# Patient Record
Sex: MALE | Employment: FULL TIME | URBAN - METROPOLITAN AREA
[De-identification: names, ages, dates, MRNs, and addresses within clinical notes are randomized per-mention and may not be internally consistent; named-entity substitution may affect disease eponyms.]

---

## 2018-07-24 ENCOUNTER — OFFICE VISIT (OUTPATIENT)
Dept: URGENT CARE | Facility: CLINIC | Age: 23
End: 2018-07-24
Payer: COMMERCIAL

## 2018-07-24 VITALS
RESPIRATION RATE: 12 BRPM | DIASTOLIC BLOOD PRESSURE: 72 MMHG | HEART RATE: 67 BPM | TEMPERATURE: 98 F | BODY MASS INDEX: 24.34 KG/M2 | OXYGEN SATURATION: 99 % | WEIGHT: 170 LBS | SYSTOLIC BLOOD PRESSURE: 115 MMHG | HEIGHT: 70 IN

## 2018-07-24 DIAGNOSIS — L03.114 CELLULITIS OF HAND, LEFT: ICD-10-CM
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
## 2018-07-24 DIAGNOSIS — Z76.89 ESTABLISHING CARE WITH NEW DOCTOR, ENCOUNTER FOR: ICD-10-CM

## 2018-07-24 DIAGNOSIS — S60.561A INSECT BITE OF RIGHT HAND WITH LOCAL REACTION, INITIAL ENCOUNTER: Primary | ICD-10-CM

## 2018-07-24 PROCEDURE — 99204 OFFICE O/P NEW MOD 45 MIN: CPT | Mod: 25,S$GLB,, | Performed by: NURSE PRACTITIONER

## 2018-07-24 PROCEDURE — 96372 THER/PROPH/DIAG INJ SC/IM: CPT | Mod: S$GLB,,, | Performed by: NURSE PRACTITIONER

## 2018-07-24 PROCEDURE — 3008F BODY MASS INDEX DOCD: CPT | Mod: CPTII,S$GLB,, | Performed by: NURSE PRACTITIONER

## 2018-07-24 RX ORDER — FINASTERIDE 5 MG/1
5 TABLET, FILM COATED ORAL DAILY
COMMUNITY
End: 2018-08-02

## 2018-07-24 RX ORDER — CEPHALEXIN 500 MG/1
500 TABLET ORAL 3 TIMES DAILY
Qty: 21 TABLET | Refills: 0 | Status: SHIPPED | OUTPATIENT
Start: 2018-07-24 | End: 2018-07-31

## 2018-07-24 RX ORDER — BETAMETHASONE SODIUM PHOSPHATE AND BETAMETHASONE ACETATE 3; 3 MG/ML; MG/ML
6 INJECTION, SUSPENSION INTRA-ARTICULAR; INTRALESIONAL; INTRAMUSCULAR; SOFT TISSUE
Status: COMPLETED | OUTPATIENT
Start: 2018-07-24 | End: 2018-07-24

## 2018-07-24 RX ORDER — DIPHENHYDRAMINE HCL 12.5MG/5ML
25 ELIXIR ORAL
Status: DISCONTINUED | OUTPATIENT
Start: 2018-07-24 | End: 2018-07-24

## 2018-07-24 RX ADMIN — BETAMETHASONE SODIUM PHOSPHATE AND BETAMETHASONE ACETATE 6 MG: 3; 3 INJECTION, SUSPENSION INTRA-ARTICULAR; INTRALESIONAL; INTRAMUSCULAR; SOFT TISSUE at 01:07

## 2018-07-24 NOTE — PROGRESS NOTES
"Subjective:       Patient ID: Robert Pnedleton is a 23 y.o. male.    Vitals:  height is 5' 9.5" (1.765 m) and weight is 77.1 kg (170 lb). His tympanic temperature is 97.7 °F (36.5 °C). His blood pressure is 115/72 and his pulse is 67. His respiration is 12 and oxygen saturation is 99%.     Chief Complaint: Insect Bite    Patient was stung by a wasp on his right hand.        Insect Bite   This is a new problem. The current episode started yesterday. The problem occurs intermittently. The problem has been gradually worsening. Associated symptoms include joint swelling and numbness. Pertinent negatives include no abdominal pain, anorexia, arthralgias, change in bowel habit, chest pain, chills, congestion, coughing, diaphoresis, fatigue, fever, headaches, myalgias, nausea, neck pain, rash, sore throat, swollen glands, urinary symptoms, vertigo, visual change, vomiting or weakness. The symptoms are aggravated by bending. Treatments tried: OTC Anti-histamine equate brand. The treatment provided no relief.     Review of Systems   Constitution: Negative for chills, diaphoresis, fatigue, fever and weakness.   HENT: Negative for congestion and sore throat.    Cardiovascular: Negative for chest pain.   Respiratory: Negative for cough and shortness of breath.    Skin: Positive for itching. Negative for rash.   Musculoskeletal: Positive for joint pain and joint swelling. Negative for arthralgias, myalgias and neck pain.   Gastrointestinal: Negative for abdominal pain, anorexia, change in bowel habit, nausea and vomiting.   Neurological: Positive for numbness. Negative for headaches and vertigo.   All other systems reviewed and are negative.      Objective:      Physical Exam   Constitutional: He is oriented to person, place, and time. Vital signs are normal. He appears well-developed and well-nourished.  Non-toxic appearance. He does not have a sickly appearance. He does not appear ill. No distress.   HENT:   Head: Normocephalic " and atraumatic. Head is without abrasion, without contusion and without laceration.   Right Ear: Hearing, tympanic membrane, external ear and ear canal normal.   Left Ear: Hearing, tympanic membrane, external ear and ear canal normal.   Nose: Nose normal. No mucosal edema, rhinorrhea or sinus tenderness.   Mouth/Throat: Uvula is midline, oropharynx is clear and moist and mucous membranes are normal. Tonsils are 1+ on the right. Tonsils are 1+ on the left. No tonsillar exudate.   Eyes: Conjunctivae, EOM and lids are normal. Pupils are equal, round, and reactive to light.   Neck: Trachea normal, normal range of motion, full passive range of motion without pain and phonation normal. Neck supple.   Cardiovascular: Normal rate, regular rhythm, S1 normal, S2 normal, normal heart sounds and normal pulses.  Exam reveals no decreased pulses.    Pulmonary/Chest: Effort normal and breath sounds normal. No stridor. No respiratory distress. He has no decreased breath sounds. He has no wheezes. He has no rhonchi. He has no rales.   Musculoskeletal: Normal range of motion.   Lymphadenopathy:     He has no cervical adenopathy.   Neurological: He is alert and oriented to person, place, and time.   Skin: Skin is warm, dry and intact. Capillary refill takes less than 2 seconds. No abrasion, no bruising, no burn, no ecchymosis, no laceration, no lesion and no rash noted. No erythema.        Right hand and forearm with swelling, warmth and slight redness.  There is a single bite to the right hand above the 4th finger close to the knuckle.  Affected area is slightly tender secondary to swelling (especially noted when clinching hand into a fist).  Pulses are 2+ and ROM is full.  See attached picture.     Psychiatric: He has a normal mood and affect. His speech is normal and behavior is normal. Judgment and thought content normal. Cognition and memory are normal.   Nursing note and vitals reviewed.            Assessment:       1. Insect  bite of right hand with local reaction, initial encounter    2. Cellulitis of hand, left    3. Establishing care with new doctor, encounter for        Plan:       Benadryl 25mg tab po X 1 in clinic.      Patient informed to return to the clinic on Thursday for re-evaluation of his left hand.      Insect bite of right hand with local reaction, initial encounter  -     diphenhydrAMINE 12.5 mg/5 mL elixir 25 mg; Take 10 mLs (25 mg total) by mouth one time.    Cellulitis of hand, left  -     betamethasone acetate-betamethasone sodium phosphate injection 6 mg; Inject 1 mL (6 mg total) into the muscle one time.  -     Ambulatory referral to Dermatology  -     cephalexin (KEFTAB) 500 mg tablet; Take 1 tablet (500 mg total) by mouth 3 (three) times daily. for 7 days  Dispense: 21 tablet; Refill: 0    Establishing care with new doctor, encounter for  -     Ambulatory referral to Internal Medicine      Patient Instructions     Insect Bite with Local Reaction  Please return here or go to the Emergency Department for any concerns or worsening of condition.   Over the Counter Claritin or Zyrtec as directed daily for the next 5-7  Days.  Please take over the counter Benadryl at night  as directed for the next 24-72 hours as needed for itching.      Please take over the counter Pepcid or Zantac as directed for the next 24-72hours as needed.   If you have a localized reaction it is ok to apply topical Benadryl and/or Cortaid as directed to the affected area.   Cool Compresses to the affected area.   Take meds as prescribed for your infection.    Keep area cleaned and dry; cover in public places  Apply topical ointment aas directed to the affected area.   Follow up with your PCP in the next 2-3 days if no improvement   If you develop an increase in redness, swelling, tenderness, drainage, fever, nausea/vomiting, ect., go to the ER.  If you were given a steroid shot in the clinic and have also been given a prescription for a steroid  such as Prednisone or a Medrol Dose Pack, please begin taking them tomorrow.       General Referral to [facility]  You were referred to [facility] to Establish Care.  Please call [phone] to schedule your appointment.    Please return here or go to the Emergency Department for any concerns or worsening of condition.  Please follow up with your primary care doctor or specialist in the next 48-72hrs as needed.    If you  smoke, please stop smoking.      Insect Bite  Insects most often bite to protect themselves or their nests. Certain bugs, like fleas and mosquitoes, bite to feed. In some cases, the actual bite causes no pain. An itchy red welt or swelling may develop at the site of the bite. Most insect bites do not cause illness. And the itching and swelling most often go away without treatment. However, an infection can develop if the bite is scratched and the skin broken. Rarely, a person may have an allergic reaction to an insect bite.  If a stinger is visible at the bite spot, remove it as quickly as possible, as this can decrease the amount of venom that gets into your body. Scrape it out with a dull edge, such as the edge of a credit card. Try not to squeeze it. Do not try to dig it out, as you may damage the skin and also increase the chance of infection.     To help reduce swelling and itching, apply a cold pack or ice in a zip-top plastic bag wrapped in a thin towel.   Home care  · Your healthcare provider may prescribe over-the-counter medicines to help relieve itching and swelling. Use each medicine according to the directions on the package. If the bite becomes infected, you will need an antibiotic. This may be in pill form taken by mouth or as an ointment or cream put directly on the skin. Be sure to use them exactly as prescribed.  · Bite symptoms usually go away on their own within a week or two.  · To help prevent infection, avoid scratching or picking at the bite.  · To  help relieve itching and swelling, apply ice in a zip-top plastic bag wrapped in a thin towel to the bites. Do this for up to 10 minutes at a time. Avoid hot showers or baths as these tend to make itching worse.  · An over-the-counter anti-itch medicine such as calamine lotion or an antihistamine cream may be helpful.  · If you suspect you have insects in your home, talk to a licensed pest-control professional. He or she can inspect your home and tell you how to get rid of bugs safely.  Follow-up care  Follow up with your healthcare provider, or as advised.  Call 911  Call 911 if any of these occur:  · Trouble breathing or swallowing  · Wheezing  · Feeling like your throat is closing up  · Fainting, loss of consciousness  · Swelling around the face or mouth  When to seek medical advice  Call your healthcare provider right away if any of these occur:  · Fever of 100.4°F (38°C) or higher, or as directed by your healthcare provider  · Signs of infection, such as increased swelling and pain, warmth, red streaks, or drainage from the skin  · Signs of allergic reaction, such as hives, a spreading rash, or throat itching  Date Last Reviewed: 10/1/2016  © 7662-3115 [employer]. [address] [place] [address], [place] [address]. All rights reserved. This information is not intended as a substitute for professional medical care. Always follow your healthcare professional's instructions.        Discharge Instructions for Cellulitis  You have been diagnosed with cellulitis. This is an infection in the deepest layer of the skin. In some cases, the infection also affects the muscle. Cellulitis is caused by bacteria. The bacteria can enter the body through broken skin. This can happen with a cut, scratch, animal bite, or an insect bite that has been scratched. You may have been treated in the hospital with antibiotics and fluids. You will likely be given a prescription for antibiotics to take at home. This sheet will help  you take care of yourself at home.  Home care  When you are home:  · Take the prescribed antibiotic medicine you are given as directed until it is gone. Take it even if you feel better. It treats the infection and stops it from returning. Not taking all the medicine can make future infections hard to treat.  · Keep the infected area clean.  · When possible, raise the infected area above the level of your heart. This helps keep swelling down.  · Talk with your healthcare provider if you are in pain. Ask what kind of over-the-counter medicine you can take for pain.  · Apply clean bandages as advised.  · Take your temperature once a day for a week.  · Wash your hands often to prevent spreading the infection.  In the future, wash your hands before and after you touch cuts, scratches, or bandages. This will help prevent infection.   When to call your healthcare provider  Call your healthcare provider immediately if you have any of the following:  · Difficulty or pain when moving the joints above or below the infected area  · Discharge or pus draining from the area  · Fever of 100.4°F (38°C) or higher, or as directed by your healthcare provider  · Pain that gets worse in or around the infected   · Redness that gets worse in or around the infected area, particularly if the area of redness expands to a wider area  · Shaking chills  · Swelling of the infected area  · Vomiting   Date Last Reviewed: 8/1/2016  © 5704-3040 [employer]. [address] [place] [id number]. All rights reserved. This information is not intended as a substitute for professional medical care. Always follow your healthcare professional's instructions.

## 2018-07-24 NOTE — PATIENT INSTRUCTIONS
Insect Bite with Local Reaction  Please return here or go to the Emergency Department for any concerns or worsening of condition.   Over the Counter Claritin or Zyrtec as directed daily for the next 5-7  Days.  Please take over the counter Benadryl at night  as directed for the next 24-72 hours as needed for itching.      Please take over the counter Pepcid or Zantac as directed for the next 24-72hours as needed.   If you have a localized reaction it is ok to apply topical Benadryl and/or Cortaid as directed to the affected area.   Cool Compresses to the affected area.   Take meds as prescribed for your infection.    Keep area cleaned and dry; cover in public places  Apply topical ointment aas directed to the affected area.   Follow up with your PCP in the next 2-3 days if no improvement   If you develop an increase in redness, swelling, tenderness, drainage, fever, nausea/vomiting, ect., go to the ER.  If you were given a steroid shot in the clinic and have also been given a prescription for a steroid such as Prednisone or a Medrol Dose Pack, please begin taking them tomorrow.       General Referral to [facility]  You were referred to [facility] to Establish Care.  Please call [phone] to schedule your appointment.    Please return here or go to the Emergency Department for any concerns or worsening of condition.  Please follow up with your primary care doctor or specialist in the next 48-72hrs as needed.    If you  smoke, please stop smoking.      Insect Bite  Insects most often bite to protect themselves or their nests. Certain bugs, like fleas and mosquitoes, bite to feed. In some cases, the actual bite causes no pain. An itchy red welt or swelling may develop at the site of the bite. Most insect bites do not cause illness. And the itching and swelling most often go away without treatment. However, an infection can develop if the bite is scratched and the skin broken. Rarely, a person  may have an allergic reaction to an insect bite.  If a stinger is visible at the bite spot, remove it as quickly as possible, as this can decrease the amount of venom that gets into your body. Scrape it out with a dull edge, such as the edge of a credit card. Try not to squeeze it. Do not try to dig it out, as you may damage the skin and also increase the chance of infection.     To help reduce swelling and itching, apply a cold pack or ice in a zip-top plastic bag wrapped in a thin towel.   Home care  · Your healthcare provider may prescribe over-the-counter medicines to help relieve itching and swelling. Use each medicine according to the directions on the package. If the bite becomes infected, you will need an antibiotic. This may be in pill form taken by mouth or as an ointment or cream put directly on the skin. Be sure to use them exactly as prescribed.  · Bite symptoms usually go away on their own within a week or two.  · To help prevent infection, avoid scratching or picking at the bite.  · To help relieve itching and swelling, apply ice in a zip-top plastic bag wrapped in a thin towel to the bites. Do this for up to 10 minutes at a time. Avoid hot showers or baths as these tend to make itching worse.  · An over-the-counter anti-itch medicine such as calamine lotion or an antihistamine cream may be helpful.  · If you suspect you have insects in your home, talk to a licensed pest-control professional. He or she can inspect your home and tell you how to get rid of bugs safely.  Follow-up care  Follow up with your healthcare provider, or as advised.  Call 911  Call 911 if any of these occur:  · Trouble breathing or swallowing  · Wheezing  · Feeling like your throat is closing up  · Fainting, loss of consciousness  · Swelling around the face or mouth  When to seek medical advice  Call your healthcare provider right away if any of these occur:  · Fever of 100.4°F (38°C) or higher, or as directed by your healthcare  provider  · Signs of infection, such as increased swelling and pain, warmth, red streaks, or drainage from the skin  · Signs of allergic reaction, such as hives, a spreading rash, or throat itching  Date Last Reviewed: 10/1/2016  © 8508-9768 [employer]. [address] [place] [id number]. All rights reserved. This information is not intended as a substitute for professional medical care. Always follow your healthcare professional's instructions.        Discharge Instructions for Cellulitis  You have been diagnosed with cellulitis. This is an infection in the deepest layer of the skin. In some cases, the infection also affects the muscle. Cellulitis is caused by bacteria. The bacteria can enter the body through broken skin. This can happen with a cut, scratch, animal bite, or an insect bite that has been scratched. You may have been treated in the hospital with antibiotics and fluids. You will likely be given a prescription for antibiotics to take at home. This sheet will help you take care of yourself at home.  Home care  When you are home:  · Take the prescribed antibiotic medicine you are given as directed until it is gone. Take it even if you feel better. It treats the infection and stops it from returning. Not taking all the medicine can make future infections hard to treat.  · Keep the infected area clean.  · When possible, raise the infected area above the level of your heart. This helps keep swelling down.  · Talk with your healthcare provider if you are in pain. Ask what kind of over-the-counter medicine you can take for pain.  · Apply clean bandages as advised.  · Take your temperature once a day for a week.  · Wash your hands often to prevent spreading the infection.  In the future, wash your hands before and after you touch cuts, scratches, or bandages. This will help prevent infection.   When to call your healthcare provider  Call your healthcare provider immediately if you have any  of the following:  · Difficulty or pain when moving the joints above or below the infected area  · Discharge or pus draining from the area  · Fever of 100.4°F (38°C) or higher, or as directed by your healthcare provider  · Pain that gets worse in or around the infected   · Redness that gets worse in or around the infected area, particularly if the area of redness expands to a wider area  · Shaking chills  · Swelling of the infected area  · Vomiting   Date Last Reviewed: 8/1/2016  © 7201-6552 [employer]. [address] [place], [place] [id number]. All rights reserved. This information is not intended as a substitute for professional medical care. Always follow your healthcare professional's instructions.

## 2018-07-27 ENCOUNTER — TELEPHONE (OUTPATIENT)
Dept: URGENT CARE | Facility: CLINIC | Age: 23
End: 2018-07-27

## 2018-07-31 NOTE — PROGRESS NOTES
Subjective:       Patient ID: Robert Pendleton is a 23 y.o. male with no significant PMH who presents today to establish care.  Patient was seen in Urgent Care on 8/2/2018 for Insect bite of righ hand and treated with Benadryl, Steroid injection, Cephalexin for 7 days.  He was Referred to Dermatology.  Resolved now.    Chief Complaint: Establish Care (both shoulders possible PT) and Low-back Pain    HPI   Patient with right lower back pain - feels like a strain after lifting at the gym a year and s/p PT for 3 months.  Improved, but exacerbated with going back to the gym.  No radiculopathy.  Having pain in shoulders when lifting weights, but no pain with no lifting.  S/p PT for this as well.  Having folliculitis in groin and recurrent.  Flares up when trimming or tight clothing.  Using baby powder and helping a little.  Patient denies f/c, n/v/d.  No chest pain or SOB.  No abdominal pain or dysuria.  No headaches or change in vision.  No dizziness.  No significant  weight gain or weight loss.  Remaining ROS negative.    Review of Systems   Constitutional: Negative for appetite change, diaphoresis, fatigue and unexpected weight change.   HENT: Negative for congestion, ear pain, hearing loss, rhinorrhea, sinus pressure, sore throat, trouble swallowing and voice change.    Eyes: Negative for photophobia, pain and visual disturbance.   Respiratory: Negative for cough, chest tightness, shortness of breath and wheezing.    Cardiovascular: Negative for chest pain, palpitations and leg swelling.   Gastrointestinal: Negative for abdominal pain, blood in stool, constipation, diarrhea, nausea and vomiting.   Endocrine: Negative for cold intolerance, heat intolerance, polydipsia, polyphagia and polyuria.   Genitourinary: Negative for decreased urine volume, difficulty urinating, discharge, dysuria, flank pain, hematuria, scrotal swelling, testicular pain and urgency.        Groin folliculitis   Musculoskeletal: Positive for  arthralgias (bilateral shoulder) and back pain. Negative for myalgias and neck pain.   Skin: Negative for rash.   Neurological: Negative for dizziness, tremors, syncope, weakness, numbness and headaches.   Hematological: Does not bruise/bleed easily.   Psychiatric/Behavioral: Negative for agitation, confusion and sleep disturbance. The patient is not nervous/anxious.        Objective:      Physical Exam   Constitutional: He is oriented to person, place, and time. He appears well-developed and well-nourished.   HENT:   Head: Normocephalic.   Nose: Nose normal.   Mouth/Throat: Oropharynx is clear and moist.   Eyes: Conjunctivae and EOM are normal. Pupils are equal, round, and reactive to light. Right eye exhibits no discharge. Left eye exhibits no discharge. No scleral icterus.   Neck: Normal range of motion. Neck supple. No thyromegaly present.   Cardiovascular: Normal rate, regular rhythm, normal heart sounds and intact distal pulses.  Exam reveals no gallop and no friction rub.    No murmur heard.  Pulmonary/Chest: Effort normal and breath sounds normal. No respiratory distress. He has no wheezes. He has no rales.   Abdominal: Soft. Bowel sounds are normal. He exhibits no distension. There is no tenderness. There is no rebound and no guarding.   Genitourinary: Rectum normal.   Genitourinary Comments: Pictures with several pustules at the base of the penis and exam with one burst pustule on scrotum.  No erythema.  No tenderness.   Musculoskeletal: Normal range of motion. He exhibits no edema, tenderness or deformity.   Lymphadenopathy:     He has no cervical adenopathy.   Neurological: He is alert and oriented to person, place, and time. No cranial nerve deficit or sensory deficit.   Skin: Skin is warm and dry. No rash noted. No erythema.   Psychiatric: He has a normal mood and affect. His behavior is normal. Thought content normal.       Assessment:       1. Bacterial folliculitis    2. Hair loss    3. Chronic pain  of both shoulders    4. Chronic bilateral low back pain without sciatica    5. Encounter for wellness examination in adult    6. Screen for STD (sexually transmitted disease)        Plan:    -Adult Wellness Exam - Patient overall stable with good BP today.   Will order fasting labs.  Offered STD screening - will order.    -[facility] - Shoulder Pain and LBP - FROM of both shoulders and no crepitance.  LBP with negative straight leg raises.  Will refer to PT for strengthening.    -Dermatology - Patient was seen in Urgent Care on 7/24/2018 for Insect bite of righ hand and treated with Benadryl, Steroid injection, Cephalexin for 7 days.  He was Referred to Dermatology - has appointment on 9/19/2018.  Takes Finasteride once a day for MPB.  Groin folliculitis -   I will prescribe topical Clindamycin to apply twice a day for 7-10 days.  Continue to use antibacterial soap and keep area dry.  Avoid shaving and clipping too close to the skin.     -[facility] - Discussed Flu and Tdap (2012) Vaccines.        -Follow up in 1 year

## 2018-08-02 ENCOUNTER — OFFICE VISIT (OUTPATIENT)
Dept: PRIMARY CARE CLINIC | Facility: CLINIC | Age: 23
End: 2018-08-02
Payer: COMMERCIAL

## 2018-08-02 VITALS
BODY MASS INDEX: 26.8 KG/M2 | HEART RATE: 83 BPM | WEIGHT: 187.19 LBS | DIASTOLIC BLOOD PRESSURE: 77 MMHG | OXYGEN SATURATION: 98 % | TEMPERATURE: 98 F | HEIGHT: 70 IN | SYSTOLIC BLOOD PRESSURE: 134 MMHG

## 2018-08-02 DIAGNOSIS — L73.8 BACTERIAL FOLLICULITIS: Primary | ICD-10-CM

## 2018-08-02 DIAGNOSIS — L65.9 HAIR LOSS: ICD-10-CM

## 2018-08-02 DIAGNOSIS — M25.511 CHRONIC PAIN OF BOTH SHOULDERS: ICD-10-CM

## 2018-08-02 DIAGNOSIS — Z11.3 SCREEN FOR STD (SEXUALLY TRANSMITTED DISEASE): ICD-10-CM

## 2018-08-02 DIAGNOSIS — M54.50 CHRONIC BILATERAL LOW BACK PAIN WITHOUT SCIATICA: ICD-10-CM

## 2018-08-02 DIAGNOSIS — G89.29 CHRONIC BILATERAL LOW BACK PAIN WITHOUT SCIATICA: ICD-10-CM

## 2018-08-02 DIAGNOSIS — Z00.00 ENCOUNTER FOR WELLNESS EXAMINATION IN ADULT: ICD-10-CM

## 2018-08-02 DIAGNOSIS — G89.29 CHRONIC PAIN OF BOTH SHOULDERS: ICD-10-CM

## 2018-08-02 DIAGNOSIS — M25.512 CHRONIC PAIN OF BOTH SHOULDERS: ICD-10-CM

## 2018-08-02 PROCEDURE — 99999 PR PBB SHADOW E&M-EST. PATIENT-LVL IV: CPT | Mod: PBBFAC,,, | Performed by: INTERNAL MEDICINE

## 2018-08-02 PROCEDURE — 3008F BODY MASS INDEX DOCD: CPT | Mod: CPTII,S$GLB,, | Performed by: INTERNAL MEDICINE

## 2018-08-02 PROCEDURE — 99203 OFFICE O/P NEW LOW 30 MIN: CPT | Mod: S$GLB,,, | Performed by: INTERNAL MEDICINE

## 2018-08-02 RX ORDER — FINASTERIDE 1 MG/1
1 TABLET, FILM COATED ORAL DAILY
Qty: 30 TABLET | Refills: 3
Start: 2018-08-02 | End: 2019-02-08

## 2018-08-02 RX ORDER — CLINDAMYCIN PHOSPHATE 11.9 MG/ML
SOLUTION TOPICAL 2 TIMES DAILY
Qty: 30 ML | Refills: 1 | Status: SHIPPED | OUTPATIENT
Start: 2018-08-02 | End: 2018-08-12

## 2018-08-02 NOTE — PATIENT INSTRUCTIONS
Your exam was overall normal today.  Your blood pressure was good.  I will refer you to Physical Therapy for your Shoulder and Back Pain.  I will order fasting labs to be scheduled at your convenience.  For your Folliculitis, I will prescribe topical Clindamycin to apply twice a day for 7-10 days.  Continue to use antibacterial soap and keep area dry.  Avoid shaving and clipping too close to the skin.  We will check and STD screen for HIV 1/2, Syphilis, Gonorrhea and Chlamydia.  Return in 1 year - sooner if needed.  Please come at least 15-20 minutes before your scheduled appointment time.      Understanding Folliculitis  Folliculitis is when hair follicles become inflamed. Follicles are the tiny holes from which hair grows out of your skin. This skin condition can occur any place on the body where hair grows. But its often found on the neck, face, and scalp.  How to say it  [id number]   What causes folliculitis?  An infection or irritation can cause this skin condition. It may be from bacteria or a fungus. The condition can also happen from a wound or irritation of the skin. Shaving is a common cause. Some cases may come from taking certain medicines, such as those that treat acne.  Symptoms of folliculitis  This skin condition tends to develop quickly. It looks like little pimples on a base of a red, inflamed hair follicles. These bumps may ooze pus. They may also be:  · Itchy  · Painful  · Red  · Swollen  Treatment for folliculitis  Treatment depends on the cause of the inflammation. In some cases, this skin condition will go away on its own in a few days. But it may return. Treatment options include:  · Warm compress. Putting a warm, wet washcloth on the inflamed skin may help.  · Medicine. Many skin (topical) and oral medicines are available. Antibiotics are used for bacterial infections. Antifungal medicines are best for fungal infections.  · Good hygiene. Keeping the skin clean can help. Use a clean  razor when shaving. Prevent ingrown hairs after shaving. This can reduce folliculitis in the beard area. Avoid any substances that bother your skin.  When to call your healthcare provider  Call your healthcare provider right away if you have any of these:  · Fever of 100.4°F (38°C) or higher, or as directed  · Pain that gets worse  · Symptoms that dont get better, or get worse  · New symptoms   Date Last Reviewed: 5/1/2016 © 2000-2017 [employer]. [address] [place] [address], [place] [address]. All rights reserved. This information is not intended as a substitute for professional medical care. Always follow your healthcare professional's instructions.

## 2018-08-02 NOTE — LETTER
August 2, 2018      Lupis Oakes, NP  [address] [facility] [address]           [place] - Primary Care  [address] [facility] [address]  Phone: [phone]  Fax: [phone]          Patient: Robert Pendleton   MR Number: [id number]   YOB: 1995   Date of Visit: 8/2/2018       Dear Lupis Oakes:    Thank you for referring Robert Pendleton to me for evaluation. Attached you will find relevant portions of my assessment and plan of care.    If you have questions, please do not hesitate to call me. I look forward to following Robert Pendleton along with you.    Sincerely,    Johny Ledezma MD    Enclosure  CC:  No Recipients    If you would like to receive this communication electronically, please contact [email or web address] or [phone] to request more information on [employer] Link access.    For providers and/or their staff who would like to refer a patient to [facility], please contact us through our one-stop-shop provider referral line, [facility], at [phone].    If you feel you have received this communication in error or would no longer like to receive these types of communications, please e-mail [email or web address]

## 2018-09-19 ENCOUNTER — INITIAL CONSULT (OUTPATIENT)
Dept: DERMATOLOGY | Facility: CLINIC | Age: 23
End: 2018-09-19
Payer: COMMERCIAL

## 2018-09-19 DIAGNOSIS — Z22.321 SUSPECTED CARRIER OF METHICILLIN SUSCEPTIBLE STAPHYLOCOCCUS AUREUS (MSSA): ICD-10-CM

## 2018-09-19 DIAGNOSIS — L73.8 BACTERIAL FOLLICULITIS: Primary | ICD-10-CM

## 2018-09-19 PROCEDURE — 99202 OFFICE O/P NEW SF 15 MIN: CPT | Mod: S$GLB,,, | Performed by: DERMATOLOGY

## 2018-09-19 RX ORDER — CLINDAMYCIN PHOSPHATE 11.9 MG/ML
SOLUTION TOPICAL
Qty: 60 ML | Refills: 3 | Status: SHIPPED | OUTPATIENT
Start: 2018-09-19 | End: 2019-06-18

## 2018-09-19 NOTE — PROGRESS NOTES
Subjective:       Patient ID:  Robert Pendleton is a 23 y.o. male who presents for   Chief Complaint   Patient presents with    Folliculitis     groin     Folliculitis  - Initial  Affected locations: groin  Duration: 1 year  Signs / symptoms: tender, redness, swelling and itching  Severity: moderate  Timing: intermittent  Aggravated by: pressure, friction and shaving  Relieving factors/Treatments tried: antibiotics (topical clindamycin, baby powder - both help but recurrent)  Improvement on treatment: significant      Review of Systems   Musculoskeletal: Negative for joint swelling and arthralgias.   Skin: Negative for tendency to form keloidal scars and recent sunburn.   Hematologic/Lymphatic: Does not bruise/bleed easily.        Objective:    Physical Exam   Constitutional: He appears well-developed and well-nourished. No distress.   Neurological: He is alert and oriented to person, place, and time. He is not disoriented.   Psychiatric: He has a normal mood and affect.   Skin:   Areas Examined (abnormalities noted in diagram):   Head / Face Inspection Performed  Neck Inspection Performed  Genitals / Buttocks / Groin Inspection Performed              Diagram Legend     Erythematous scaling macule/papule c/w actinic keratosis       Vascular papule c/w angioma      Pigmented verrucoid papule/plaque c/w seborrheic keratosis      Yellow umbilicated papule c/w sebaceous hyperplasia      Irregularly shaped tan macule c/w lentigo     1-2 mm smooth white papules consistent with Milia      Movable subcutaneous cyst with punctum c/w epidermal inclusion cyst      Subcutaneous movable cyst c/w pilar cyst      Firm pink to brown papule c/w dermatofibroma      Pedunculated fleshy papule(s) c/w skin tag(s)      Evenly pigmented macule c/w junctional nevus     Mildly variegated pigmented, slightly irregular-bordered macule c/w mildly atypical nevus      Flesh colored to evenly pigmented papule c/w intradermal nevus        Pink pearly papule/plaque c/w basal cell carcinoma      Erythematous hyperkeratotic cursted plaque c/w SCC      Surgical scar with no sign of skin cancer recurrence      Open and closed comedones      Inflammatory papules and pustules      Verrucoid papule consistent consistent with wart     Erythematous eczematous patches and plaques     Dystrophic onycholytic nail with subungual debris c/w onychomycosis     Umbilicated papule    Erythematous-base heme-crusted tan verrucoid plaque consistent with inflamed seborrheic keratosis     Erythematous Silvery Scaling Plaque c/w Psoriasis     See annotation      Assessment / Plan:        Bacterial folliculitis + Suspected carrier of methicillin susceptible Staphylococcus aureus (MSSA)  -     clindamycin (CLEOCIN T) 1 % external solution; Apply to affected areas of body BID prn folliculitis.  Dispense: 60 mL; Refill: 3  -  Recommended a benzoyl peroxide (2-5%) wash, such as PanOxyl 4% Creamy Wash or Neutrogena Clear Pore Cleanser/Mask. Reminded patient that benzoyl peroxide can bleach towels, sheets, and clothing if not rinsed well from the skin.  -  Recommended washing body with Hibiclens solution for 5-10 minutes 2x/week until clear. Do not allow contact with eyes or ears.  -  It is recommended that patient discontinue shaving until condition is completely resolved.       Follow-up in about 2 months (around 11/19/2018), or if symptoms worsen or fail to improve.

## 2018-09-19 NOTE — LETTER
September 19, 2018      Lupis Oakes, NP  [address] [facility] [address]           [facility] - Dermatology  [address] [facility] [address]  Phone: [phone]  Fax: [phone]          Patient: Robert Pendleton   MR Number: [id number]   YOB: 1995   Date of Visit: 9/19/2018       Dear Lupis Oakes:    Thank you for referring Robert Pendleton to me for evaluation. Attached you will find relevant portions of my assessment and plan of care.    If you have questions, please do not hesitate to call me. I look forward to following Robert Pendleton along with you.    Sincerely,    Lina Fowler MD    Enclosure  CC:  No Recipients    If you would like to receive this communication electronically, please contact [email or web address] or [phone] to request more information on [employer] Link access.    For providers and/or their staff who would like to refer a patient to [facility], please contact us through our one-stop-shop provider referral line, [facility], at [phone].    If you feel you have received this communication in error or would no longer like to receive these types of communications, please e-mail [email or web address]

## 2019-01-08 ENCOUNTER — TELEPHONE (OUTPATIENT)
Dept: PRIMARY CARE CLINIC | Facility: CLINIC | Age: 24
End: 2019-01-08

## 2019-01-08 NOTE — TELEPHONE ENCOUNTER
Tried to call pt back, but he didn't answer, and a voicemail wasn't set up.    ----- Message from Elizabeth Peters sent at 1/8/2019  3:07 PM CST -----  Contact: RAUL ARTHUR [[id number]]  Name of Who is Calling: RAUL ARTHUR [[id number]]      What is the request in detail: Patient called he need another referral for PT. Please call him.      Can the clinic reply by [facility]: no      What Number to Call Back [facility] not in [facility]: [phone]

## 2019-01-30 ENCOUNTER — TELEPHONE (OUTPATIENT)
Dept: PRIMARY CARE CLINIC | Facility: CLINIC | Age: 24
End: 2019-01-30

## 2019-01-30 DIAGNOSIS — M54.5 CHRONIC MIDLINE LOW BACK PAIN, WITH SCIATICA PRESENCE UNSPECIFIED: Primary | ICD-10-CM

## 2019-01-30 DIAGNOSIS — G89.29 CHRONIC MIDLINE LOW BACK PAIN, WITH SCIATICA PRESENCE UNSPECIFIED: Primary | ICD-10-CM

## 2019-01-30 NOTE — TELEPHONE ENCOUNTER
Spoke w/ pt and he is requesting a new pt referral.    ----- Message from Cecilia Michael sent at 1/30/2019 12:03 PM CST -----  Contact: RAUL ARTHUR [[id number]]  Name of Who is Calling:RAUL ARTHUR [[id number]]        What is the request in detail: Pt  Returned call to staff regarding referral to Physical Therapy. Contact at your earliest convenience.  Thanks-          Can the clinic reply by [facility]: N    What Number to Call Back [facility] not in [facility]: [phone]

## 2019-02-08 ENCOUNTER — OFFICE VISIT (OUTPATIENT)
Dept: DERMATOLOGY | Facility: CLINIC | Age: 24
End: 2019-02-08
Payer: COMMERCIAL

## 2019-02-08 DIAGNOSIS — A60.01 HERPES SIMPLEX INFECTION OF PENIS: ICD-10-CM

## 2019-02-08 DIAGNOSIS — B07.9 VIRAL WARTS, UNSPECIFIED TYPE: Primary | ICD-10-CM

## 2019-02-08 DIAGNOSIS — L64.9 ANDROGENIC ALOPECIA: ICD-10-CM

## 2019-02-08 PROCEDURE — 17110 DESTRUCTION B9 LES UP TO 14: CPT | Mod: S$GLB,,, | Performed by: DERMATOLOGY

## 2019-02-08 PROCEDURE — 99214 PR OFFICE/OUTPT VISIT, EST, LEVL IV, 30-39 MIN: ICD-10-PCS | Mod: 25,S$GLB,, | Performed by: DERMATOLOGY

## 2019-02-08 PROCEDURE — 99214 OFFICE O/P EST MOD 30 MIN: CPT | Mod: 25,S$GLB,, | Performed by: DERMATOLOGY

## 2019-02-08 PROCEDURE — 17110 PR DESTRUCTION BENIGN LESIONS UP TO 14: ICD-10-PCS | Mod: S$GLB,,, | Performed by: DERMATOLOGY

## 2019-02-08 RX ORDER — FINASTERIDE 1 MG/1
1 TABLET, FILM COATED ORAL DAILY
Qty: 90 TABLET | Refills: 3 | Status: SHIPPED | OUTPATIENT
Start: 2019-02-08 | End: 2020-02-03

## 2019-02-08 RX ORDER — VALACYCLOVIR HYDROCHLORIDE 1 G/1
1000 TABLET, FILM COATED ORAL DAILY
Qty: 30 TABLET | Refills: 3 | Status: SHIPPED | OUTPATIENT
Start: 2019-02-08 | End: 2019-06-14 | Stop reason: SDUPTHER

## 2019-02-08 NOTE — PROGRESS NOTES
Subjective:       Patient ID:  Robert Pendleton is a 23 y.o. male who presents for   Chief Complaint   Patient presents with    Folliculitis     groin    Growth     R ear     Folliculitis  - Follow-up  Symptom course: worsening (getting 1 breakout per week)  Currently using: clindamycin.  Affected locations: left upper leg and genitalia  Signs / symptoms: spreading, burning and itching (feels itching and a pain down his left leg prior to breakout of red bumps)  Severity: moderate    Growth  - Initial  Affected locations: right ear  Duration: 5 months  Signs / symptoms: growing  Severity: mild to moderate  Timing: constant  Aggravated by: picking  Relieving factors/Treatments tried: nothing  Improvement on treatment: no relief    Hair/Scalp Problem  - Initial  Affected locations: scalp  Duration: 1 year  Signs / symptoms: asymptomatic  Severity: mild to moderate  Timing: constant  Aggravated by: nothing  Treatments tried: has taken propecia with no problems, needs a refill.  Improvement on treatment: mild      Review of Systems   Musculoskeletal: Negative for joint swelling and arthralgias.   Skin: Positive for itching and rash. Negative for recent sunburn.   Hematologic/Lymphatic: Does not bruise/bleed easily.        Objective:    Physical Exam   Constitutional: He appears well-developed and well-nourished. No distress.   HENT:   Mouth/Throat: Lips normal.    Eyes: Lids are normal.  No conjunctival no injection.   Neurological: He is alert and oriented to person, place, and time. He is not disoriented.   Psychiatric: He has a normal mood and affect.   Skin:   Areas Examined (abnormalities noted in diagram):   Head / Face Inspection Performed  Neck Inspection Performed  Genitals / Buttocks / Groin Inspection Performed  RUE Inspected  LUE Inspection Performed                   Diagram Legend     Erythematous scaling macule/papule c/w actinic keratosis       Vascular papule c/w angioma      Pigmented verrucoid  papule/plaque c/w seborrheic keratosis      Yellow umbilicated papule c/w sebaceous hyperplasia      Irregularly shaped tan macule c/w lentigo     1-2 mm smooth white papules consistent with Milia      Movable subcutaneous cyst with punctum c/w epidermal inclusion cyst      Subcutaneous movable cyst c/w pilar cyst      Firm pink to brown papule c/w dermatofibroma      Pedunculated fleshy papule(s) c/w skin tag(s)      Evenly pigmented macule c/w junctional nevus     Mildly variegated pigmented, slightly irregular-bordered macule c/w mildly atypical nevus      Flesh colored to evenly pigmented papule c/w intradermal nevus       Pink pearly papule/plaque c/w basal cell carcinoma      Erythematous hyperkeratotic cursted plaque c/w SCC      Surgical scar with no sign of skin cancer recurrence      Open and closed comedones      Inflammatory papules and pustules      Verrucoid papule consistent consistent with wart     Erythematous eczematous patches and plaques     Dystrophic onycholytic nail with subungual debris c/w onychomycosis     Umbilicated papule    Erythematous-base heme-crusted tan verrucoid plaque consistent with inflamed seborrheic keratosis     Erythematous Silvery Scaling Plaque c/w Psoriasis     See annotation      Assessment / Plan:        Viral warts, unspecified type  Discussed viral etiology of warts and their treatment options. Discussed the potential need for multiple treatments.    Cryosurgery procedure note:  Risk, benefits, and alternatives of cryosurgery are discussed with the patient, including risk of hypo- or hyperpigmentation, scar, infection, recurrence, and need for additional treatment of site. Verbal consent obtained from patient. Liquid nitrogen cryosurgery applied to 1 lesion(s) to produce a freeze injury. Counseled patient that blisters may form and instructed patient on wound care with gentle cleansing and use of Vaseline ointment to keep moist until healed. Handout was provided, and  patient was instructed to return to clinic in 1-2 months if lesions do not completely resolve.    Herpes simplex infection of penis  Based on patient's recent description of symptoms, but without ever seeing the lesions personally, I suspect HSV. Patient was shown a representative photo of HSV and agrees with the diagnosis. Due to the fact that he is currently having weekly outbreaks, will put him on a suppressive dose of valtrex.  -     valACYclovir (VALTREX) 1000 MG tablet; Take 1 tablet (1,000 mg total) by mouth once daily.  Dispense: 30 tablet; Refill: 3    Androgenic alopecia  -     finasteride (PROPECIA) 1 mg tablet; Take 1 tablet (1 mg total) by mouth once daily.  Dispense: 90 tablet; Refill: 3  - D/C if any side effects such as decreased libido or erectile dysfunction.      Follow-up in about 4 weeks (around 3/8/2019).

## 2019-02-15 ENCOUNTER — OFFICE VISIT (OUTPATIENT)
Dept: URGENT CARE | Facility: CLINIC | Age: 24
End: 2019-02-15
Payer: COMMERCIAL

## 2019-02-15 ENCOUNTER — CLINICAL SUPPORT (OUTPATIENT)
Dept: REHABILITATION | Facility: OTHER | Age: 24
End: 2019-02-15
Payer: COMMERCIAL

## 2019-02-15 VITALS
RESPIRATION RATE: 16 BRPM | WEIGHT: 195 LBS | BODY MASS INDEX: 28.88 KG/M2 | SYSTOLIC BLOOD PRESSURE: 125 MMHG | OXYGEN SATURATION: 98 % | DIASTOLIC BLOOD PRESSURE: 79 MMHG | HEART RATE: 63 BPM | TEMPERATURE: 98 F | HEIGHT: 69 IN

## 2019-02-15 DIAGNOSIS — M79.662 PAIN OF LEFT CALF: ICD-10-CM

## 2019-02-15 DIAGNOSIS — M54.50 CHRONIC RIGHT-SIDED LOW BACK PAIN WITHOUT SCIATICA: ICD-10-CM

## 2019-02-15 DIAGNOSIS — M25.572 ACUTE LEFT ANKLE PAIN: Primary | ICD-10-CM

## 2019-02-15 DIAGNOSIS — M25.472 LEFT ANKLE SWELLING: ICD-10-CM

## 2019-02-15 DIAGNOSIS — G89.29 CHRONIC RIGHT-SIDED LOW BACK PAIN WITHOUT SCIATICA: ICD-10-CM

## 2019-02-15 PROCEDURE — 99214 OFFICE O/P EST MOD 30 MIN: CPT | Mod: S$GLB,,, | Performed by: NURSE PRACTITIONER

## 2019-02-15 PROCEDURE — 97110 THERAPEUTIC EXERCISES: CPT | Mod: PN | Performed by: INTERNAL MEDICINE

## 2019-02-15 PROCEDURE — 3008F PR BODY MASS INDEX (BMI) DOCUMENTED: ICD-10-PCS | Mod: CPTII,S$GLB,, | Performed by: NURSE PRACTITIONER

## 2019-02-15 PROCEDURE — 99214 PR OFFICE/OUTPT VISIT, EST, LEVL IV, 30-39 MIN: ICD-10-PCS | Mod: S$GLB,,, | Performed by: NURSE PRACTITIONER

## 2019-02-15 PROCEDURE — 3008F BODY MASS INDEX DOCD: CPT | Mod: CPTII,S$GLB,, | Performed by: NURSE PRACTITIONER

## 2019-02-15 PROCEDURE — 97161 PT EVAL LOW COMPLEX 20 MIN: CPT | Mod: PN | Performed by: INTERNAL MEDICINE

## 2019-02-15 RX ORDER — NAPROXEN 500 MG/1
500 TABLET ORAL 2 TIMES DAILY WITH MEALS
Qty: 20 TABLET | Refills: 0 | Status: SHIPPED | OUTPATIENT
Start: 2019-02-15 | End: 2019-02-25

## 2019-02-15 NOTE — PROGRESS NOTES
"Subjective:       Patient ID: Robert Pendleton is a 23 y.o. male.    Vitals:  height is 5' 9" (1.753 m) and weight is 88.5 kg (195 lb). His temperature is 98.1 °F (36.7 °C). His blood pressure is 125/79 and his pulse is 63. His respiration is 16 and oxygen saturation is 98%.     Chief Complaint: Ankle Pain (left ankle )    Pt is in for left ankle pain/swelling- started about 4 wks ago. Pt denies injury. Pt took ibuprofen-with no relief.   Working in  office-standing/walking during the day. No hx of gout, trauma, dvt. Reports new shoes approx same time pain started. Reports only wearing shoes 3-4 times. Reports left foot swelling by the end of the day. Reports last night, pain throughout night and now with some left calf pain. Denies sob, lane. Denies recent air travel, hospitalization.       Ankle Pain    The incident occurred more than 1 week ago. The pain is present in the left ankle. The quality of the pain is described as aching and burning. The pain is at a severity of 6/10. The pain has been constant since onset. Associated symptoms include numbness. Pertinent negatives include no inability to bear weight, loss of motion, loss of sensation, muscle weakness or tingling. He reports no foreign bodies present. He has tried NSAIDs for the symptoms. The treatment provided no relief.       Constitution: Negative for chills, sweating, fatigue and fever.   HENT: Negative for ear pain, congestion, sinus pain, sinus pressure, sore throat and voice change.    Neck: Negative for painful lymph nodes.   Cardiovascular: Negative for chest pain and leg swelling.   Eyes: Negative for eye redness, double vision and blurred vision.   Respiratory: Negative for chest tightness, cough, sputum production, bloody sputum, COPD, shortness of breath, stridor, wheezing and asthma.    Gastrointestinal: Negative for nausea, vomiting and diarrhea.   Genitourinary: Negative for dysuria, frequency and urgency. "   Musculoskeletal: Positive for joint pain and joint swelling. Negative for muscle cramps and muscle ache.   Skin: Negative for color change, pale and rash.   Allergic/Immunologic: Negative for seasonal allergies and asthma.   Neurological: Positive for numbness. Negative for dizziness, history of vertigo, light-headedness, passing out and headaches.   Hematologic/Lymphatic: Negative for swollen lymph nodes, easy bruising/bleeding and history of blood clots. Does not bruise/bleed easily.   Psychiatric/Behavioral: Negative for nervous/anxious, sleep disturbance and depression. The patient is not nervous/anxious.        Objective:      Physical Exam   Constitutional: He is oriented to person, place, and time. He appears well-developed and well-nourished. He is cooperative.  Non-toxic appearance. He does not appear ill. No distress.   HENT:   Head: Normocephalic and atraumatic. Head is without abrasion, without contusion and without laceration.   Right Ear: Hearing, tympanic membrane, external ear and ear canal normal. No hemotympanum.   Left Ear: Hearing, tympanic membrane, external ear and ear canal normal. No hemotympanum.   Nose: Nose normal. No mucosal edema, rhinorrhea or nasal deformity. No epistaxis. Right sinus exhibits no maxillary sinus tenderness and no frontal sinus tenderness. Left sinus exhibits no maxillary sinus tenderness and no frontal sinus tenderness.   Mouth/Throat: Uvula is midline, oropharynx is clear and moist and mucous membranes are normal. No trismus in the jaw. Normal dentition. No uvula swelling. No posterior oropharyngeal erythema.   Eyes: Conjunctivae, EOM and lids are normal. Pupils are equal, round, and reactive to light. Right eye exhibits no discharge. Left eye exhibits no discharge. No scleral icterus.   Sclera clear bilat   Neck: Trachea normal, normal range of motion, full passive range of motion without pain and phonation normal. Neck supple. No spinous process tenderness and no  muscular tenderness present. No neck rigidity. No tracheal deviation present.   Cardiovascular: Normal rate, regular rhythm, normal heart sounds, intact distal pulses and normal pulses.   Pulmonary/Chest: Effort normal and breath sounds normal. No respiratory distress.   Abdominal: Soft. Normal appearance and bowel sounds are normal. He exhibits no distension, no pulsatile midline mass and no mass. There is no tenderness.   Musculoskeletal: Normal range of motion. He exhibits no edema or deformity.        Left ankle: He exhibits normal range of motion, no swelling, no laceration and normal pulse. No tenderness. No lateral malleolus and no medial malleolus tenderness found. Achilles tendon normal.        Left lower leg: He exhibits tenderness.   Neurological: He is alert and oriented to person, place, and time. He has normal strength. No cranial nerve deficit or sensory deficit. He exhibits normal muscle tone. He displays no seizure activity. Coordination normal. GCS eye subscore is 4. GCS verbal subscore is 5. GCS motor subscore is 6.   Skin: Skin is warm, dry and intact. Capillary refill takes less than 2 seconds. No abrasion, no bruising, no burn, no ecchymosis and no laceration noted. He is not diaphoretic. No pallor.   Psychiatric: He has a normal mood and affect. His speech is normal and behavior is normal. Judgment and thought content normal. Cognition and memory are normal.   Nursing note and vitals reviewed.      Assessment:       1. Acute left ankle pain    2. Left ankle swelling    3. Pain of left calf        Plan:     [facility] called to set up outpatient /s     Acute left ankle pain  -     naproxen (NAPROSYN) 500 MG tablet; Take 1 tablet (500 mg total) by mouth 2 (two) times daily with meals. for 10 days  Dispense: 20 tablet; Refill: 0  -     US Lower Extremity Veins Left; Future; Expected date: 02/15/2019    Left ankle swelling  -     US Lower Extremity Veins Left; Future; Expected date:  02/15/2019    Pain of left calf  -     US Lower Extremity Veins Left; Future; Expected date: 02/15/2019      Patient Instructions   Follow up with your doctor in a few days.  Return to the urgent care or go to the ER if symptoms get worse.    naproxyn as directed for 10 days.   Keep elevated when at home.    Please call 3 [phone] if you do not hear from [facility] to get your referral appointment we discussed. outpt ultrasound ordered.             Acute Pain, Uncertain Cause  Pain can be caused by many conditions that range from very minor to very serious. In some cases, though, pain comes and goes with no apparent cause.  We were not able to find the exact cause for your pain. At this time there is no sign of any serious illness causing your pain. More tests may be needed to determine the cause. In many cases, pain like this goes away by itself.  Home care  Take any medicines as prescribed. If another medicine was not prescribed for pain, you can take an over-the-counter pain medicine such as ibuprofen or acetaminophen. Use these as directed on the label.    Follow-up care  Follow up with your healthcare provider or our staff as directed.  When to seek medical advice  Call your healthcare provider for any of the following:  · Pain changes in pattern  · Pain doesn't lessen or gets worse  · New symptoms appear  · Fever of 100.4ºF (38ºC) or higher, or as directed by your healthcare provider  Date Last Reviewed: 7/26/2015  © 0676-5962 [employer]. [address] [place] [address], [place] [address]. All rights reserved. This information is not intended as a substitute for professional medical care. Always follow your healthcare professional's instructions.

## 2019-02-15 NOTE — PATIENT INSTRUCTIONS
Follow up with your doctor in a few days.  Return to the urgent care or go to the ER if symptoms get worse.    naproxyn as directed for 10 days.   Keep elevated when at home.    Please call 1 [phone] if you do not hear from [facility] to get your referral appointment we discussed. outpt ultrasound ordered.             Acute Pain, Uncertain Cause  Pain can be caused by many conditions that range from very minor to very serious. In some cases, though, pain comes and goes with no apparent cause.  We were not able to find the exact cause for your pain. At this time there is no sign of any serious illness causing your pain. More tests may be needed to determine the cause. In many cases, pain like this goes away by itself.  Home care  Take any medicines as prescribed. If another medicine was not prescribed for pain, you can take an over-the-counter pain medicine such as ibuprofen or acetaminophen. Use these as directed on the label.    Follow-up care  Follow up with your healthcare provider or our staff as directed.  When to seek medical advice  Call your healthcare provider for any of the following:  · Pain changes in pattern  · Pain doesn't lessen or gets worse  · New symptoms appear  · Fever of 100.4ºF (38ºC) or higher, or as directed by your healthcare provider  Date Last Reviewed: 7/26/2015  © 9937-7732 [employer]. [address] [place] [address], [place] [address]. All rights reserved. This information is not intended as a substitute for professional medical care. Always follow your healthcare professional's instructions.

## 2019-02-18 NOTE — PLAN OF CARE
[facility]  Physical Therapy Initial Evaluation    Name: Robert Pendleton  Clinic Number: [id number]    Therapy Diagnosis:   Encounter Diagnosis   Name Primary?    Chronic right-sided low back pain without sciatica      Physician: Johny Ledezma MD    Physician Orders: PT Eval and Treat    Medical Diagnosis: M54.5,G89.29 (ICD-10-CM) - Chronic midline low back pain, with sciatica presence unspecified     Date of Surgery: NA  Evaluation Date: 2/15/2019  Authorization Period Expiration: 12/31/2019  Plan of Care Certification Period: 5/12/2019  Visit # / Visits authorized: 1/ 20    Time In: 9: 10 am   Time Out: 9:55 am   Total Billable Time: 45 minutes    Precautions: Standard    Subjective   Date of onset: 7 mos since lifting weights ( squats). Stated his hips twisted a little which caused his back to twist.  History of current condition - Robert reports: R lbp/ lat pain with activity.  No rad sx's  Or bowel/ bladder c/o.       Past Medical History:   Diagnosis Date    Hair loss     HSV-2 (herpes simplex virus 2) infection      Robert Pendleton  has a past surgical history that includes Mouth surgery.    Robert has a current medication list which includes the following prescription(s): clindamycin, finasteride, naproxen, and valacyclovir.    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, walking  Prior Level of Function: lifting weights  Current Level of Function: unable to lift weights    Sleeping position: sidelying  Pain:  Current 4/10, worst 7/10, Location:       Aggravating Factors: activity, turning in bed, lifting weights  Easing Factors:  rest    Pts goals: decrease pain, r/t weight lifting    Objective     Amb I   MMT:    Hip flex B 5  Hip abd R 4, L 5  Hip ext  B  4  Knee ext  B 5  Knee flex B 5  Dorsiflexion B 5  Plantar flexion B 5  Able to heel/ toe walk    Back AROM:   arom flexion  75%  Extension 75% with R LBP  sidebending R 75%  sidebending L 75%  R LBP       Flexibility testing:  HS flex R approx - 40, L - 30  Piriformis flex WFL B       Special tests:     SLR neg B   Epi's Neg B       Palpation/ joint mob:   ttp and guarding R latissimus, decreased pa jm T 12- L 4 with ttp T 12/ L 1      CMS Impairment/Limitation/Restriction for FOTO Lower Back  Survey    Therapist reviewed FOTO scores for Robert Pendleton on 2/15/2019.   FOTO documents entered into [employer] - see Media section.    Limitation Score: 30%  Category: mobility       Current : CJ = at least 20% but < 40% impaired, limited or restricted  Goal: CJ = at least 20% but < 40% impaired, limited or restricted  Discharge:NA         TREATMENT   Treatment Time In:9: 30 am   Treatment Time Out: 9: 55 am   Total Treatment time separate from Evaluation time:25 min     Robert received therapeutic exercises to develop strength, endurance, ROM, flexibility, posture and core stabilization for 15 minutes including:       Transverse abdominal contraction 10 sec x10   Lower trunk rotation 20 sec x 3  Quadruped UE/ LE 10 sec x 5  Clams 10x B       Robert received the following manual therapy techniques: dry needling were applied   Pt signed written consent to dry needling Rx.  Pt gave verbal consent for DN.    DN Time: 6 min   Pt rec'd dry needling to B L 1  with 30 mm  needles for range of motion, decrease pain with no adverse effects. Pt also rec'd 30 mm R lat transverse application with decreased muscle tightness and improved pain with trunk extension.           Education provided re:posture ed, mechanics supine to sit, selena TA with ex/ adls, avoiding rad sx's with ex.  Ed to cont to use ice 10-20 min daily. Pt verbalized  understanding.   Discussed use of lumbar roll with sitting.    Written Home Exercises  Provided: see above  Exercises were reviewed and Robert was able to demonstrate them prior to the end of the session.   Pt received a written copy of exercises to perform at home. Robert demonstrated good  understanding of the education provided.     See EMR under MEDIA for exercises given.   Assessment   Robert is a 23 y.o. male referred to outpatient Physical Therapy with a medical diagnosis of LB strain R. Pt presents with   LB pain, stiffness, weakness, decreased functional ability.    Pt prognosis is Excellent.   Pt will benefit from skilled outpatient Physical Therapy to address the deficits stated above and in the chart below, provide pt/family education, and to maximize pt's level of independence.      Goals:  Short Term Goals: 2 weeks          1. Decreased R low back pain to 5/10  Long Term Goals: 12 weeks     1. Independent with HEP.  2. Report decreased   low   Back  pain  <   / =  2  /10 with adls such as  Bed t/f, sit to stand  3. Increased MMT  for  B Hip ext and hip abd to 5/5    4. Increased arom  for  LB  Ext to 100 %   5. Improved FOTO score to 22% so he can r/t exercise       Plan of care discussed with patient: Yes  Pt's spiritual, cultural and educational needs considered and patient is agreeable to the plan of care and goals as stated below:     Anticipated Barriers for therapy:none     Medical Necessity is demonstrated by the following  History  Co-morbidities and personal factors that may impact the plan of care Co-morbidities:    none  Personal Factors:   no deficits no deficits     low   Examination  Body Structures and Functions, activity limitations and participation restrictions that may impact the plan of care Body Regions:   lower extremities  trunk    Body Systems:    ROM  strength  gait  transfers    Participation Restrictions:   Exercise, weight lifting    Activity limitations:   Learning and applying knowledge  no deficits    General Tasks and Commands  no  deficits    Communication  no deficits    Mobility  See participation restrictions    Self care  no deficits    Domestic Life  no deficits    Interactions/Relationships  no deficits    Life Areas  no deficits    Community and Social Life  no deficits         low   Clinical Presentation stable and uncomplicated low   Decision Making/ Complexity Score: LOW   Mod- 1-2  personal factors/ comorbidities, address 3+ elements  High 3+ personal factors/ comorbidities, address 4+ elements, unstable clinical presentation       Plan   Certification Period/Plan of care expiration:see above.      Outpatient Physical Therapy 1 times weekly for  12  weeks to include the following interventions: Cervical/Lumbar Traction, Electrical Stimulation  , Manual Therapy, Moist Heat/ Ice, Neuromuscular Re-ed, Patient Education and Therapeutic Exercise, dry needling.     Joann Grigsby, PT

## 2019-02-28 ENCOUNTER — CLINICAL SUPPORT (OUTPATIENT)
Dept: REHABILITATION | Facility: OTHER | Age: 24
End: 2019-02-28
Payer: COMMERCIAL

## 2019-02-28 DIAGNOSIS — M54.50 CHRONIC RIGHT-SIDED LOW BACK PAIN WITHOUT SCIATICA: ICD-10-CM

## 2019-02-28 DIAGNOSIS — G89.29 CHRONIC RIGHT-SIDED LOW BACK PAIN WITHOUT SCIATICA: ICD-10-CM

## 2019-02-28 PROCEDURE — 97110 THERAPEUTIC EXERCISES: CPT | Mod: PN | Performed by: INTERNAL MEDICINE

## 2019-02-28 NOTE — PROGRESS NOTES
"[facility]  Physical Therapy treatment note    Name: Robert Pendleton  Clinic Number: [id number]     Therapy Diagnosis:        Encounter Diagnosis   Name Primary?    Chronic right-sided low back pain without sciatica        Physician: Johny Ledezma MD     Physician Orders: PT Eval and Treat    Medical Diagnosis: M54.5,G89.29 (ICD-10-CM) - Chronic midline low back pain, with sciatica presence unspecified      Date of Surgery: NA  Evaluation Date: 2/15/2019  Authorization Period Expiration: 12/31/2019  Plan of Care Certification Period: 5/12/2019  Visit # / Visits authorized: 2/ 20   visit date 2/28/2019    Time In: 9: 15 am   Time Out: 9:55 am   Total Billable Time: 40  minutes     Precautions: Standard     Subjective   4/10 lat R trunk today, earlier this week 7/10 probably due to standing at parades. No problems after last visit. "This is the most exercise I have had in a year" ( pt referring to this PT session).     Current 4/10, worst 7/10, Location:  R LB       Pts goals: decrease pain, r/t weight lifting     Objective      Amb I   2/15 MMT:     Hip flex B 5  Hip abd R 4, L 5  Hip ext  B 4        2/15 Back AROM:   arom flexion  75%  Extension 75% with R LBP  sidebending R 75%  sidebending L 75%  R LBP         2/15 Flexibility testing:  HS flex R approx - 40, L - 30  Piriformis flex WFL B            2/15 Palpation/ joint mob:   ttp and guarding R latissimus, decreased pa jm T 12- L 4 with ttp T 12/ L 1        CMS Impairment/Limitation/Restriction for FOTO Lower Back  Survey     Therapist reviewed FOTO scores for Robert Pendleton on 2/15/2019.   FOTO documents entered into [employer] - see Media section.     Limitation Score: 30%  Category: mobility        Current : CJ = at least 20% but < 40% impaired, limited or restricted  Goal: CJ = at least 20% but < 40% impaired, limited or restricted  Discharge:NA            TREATMENT          Robert received therapeutic exercises to " develop strength, endurance, ROM, flexibility, posture and core stabilization for 45 minutes including:      ube 6 min   Transverse abdominal contraction 10 sec x10  Ball squeeze  B shd ext gtb 3 x 10 - nv  Lower trunk rotation 20 sec x 3  ltr oscill 3 min red  ltr rot pb 2 min   Bridges pb 10 sec x 10   Quadruped UE/ LE 10 sec x 5  Clams 10x  3 B  Sl hip abd 20x  gss slant   hss  Stairs  90 sec each  - add to hep nv       .           Education provided re:posture ed, mechanics supine to sit, selena TA with ex/ adls, avoiding rad sx's with ex.  Ed to cont to use ice 10-20 min daily. Pt verbalized  understanding.   Discussed use of lumbar roll with sitting.     Written Home Exercises Provided:  bridging, sl hip abd  Exercises were reviewed and Robert was able to demonstrate them prior to the end of the session.   Pt received a written copy of exercises to perform at home. Robert demonstrated good  understanding of the education provided.      See EMR under pt instructions for exercises given 2/28.   Assessment   No c/o during session, cues needed for posture, core engagement.    Pt prognosis is Excellent.   Pt will benefit from skilled outpatient Physical Therapy to address the deficits stated above and in the chart below, provide pt/family education, and to maximize pt's level of independence.       Goals:  Short Term Goals: 2 weeks          1. Decreased R low back pain to 5/10 Progressing, not met  Long Term Goals: 12 weeks      1. Independent with HEP. Progressing, not met  2. Report decreased   low   Back  pain  <   / =  2  /10 with adls such as  Bed t/f, sit to stand Progressing, not met  3. Increased MMT  for  B Hip ext and hip abd to 5/5  Progressing, not met  4. Increased arom  for  LB  Ext to 100 %  Progressing, not met  5. Improved FOTO score to 22% so he can r/t exercise  Progressing, not met        Pt's spiritual, cultural and educational needs considered and patient is agreeable to the plan of  care and goals as stated below:      Anticipated Barriers for therapy:none              Plan   Certification Period/Plan of care expiration:see above.       Outpatient Physical Therapy 1 times weekly for  11  weeks to include the following interventions: Cervical/Lumbar Traction, Electrical Stimulation  , Manual Therapy, Moist Heat/ Ice, Neuromuscular Re-ed, Patient Education and Therapeutic Exercise, dry needling.      Joann Grigsby, PT

## 2019-03-15 ENCOUNTER — DOCUMENTATION ONLY (OUTPATIENT)
Dept: REHABILITATION | Facility: OTHER | Age: 24
End: 2019-03-15

## 2019-03-22 ENCOUNTER — CLINICAL SUPPORT (OUTPATIENT)
Dept: REHABILITATION | Facility: OTHER | Age: 24
End: 2019-03-22
Payer: COMMERCIAL

## 2019-03-22 DIAGNOSIS — M54.50 CHRONIC RIGHT-SIDED LOW BACK PAIN WITHOUT SCIATICA: ICD-10-CM

## 2019-03-22 DIAGNOSIS — G89.29 CHRONIC RIGHT-SIDED LOW BACK PAIN WITHOUT SCIATICA: ICD-10-CM

## 2019-03-22 PROCEDURE — 97110 THERAPEUTIC EXERCISES: CPT | Mod: PN | Performed by: INTERNAL MEDICINE

## 2019-03-22 NOTE — PROGRESS NOTES
[facility]  Physical Therapy treatment note    Name: Robert Pendleton  Clinic Number: [id number]     Therapy Diagnosis:        Encounter Diagnosis   Name Primary?    Chronic right-sided low back pain without sciatica        Physician: Johny Ledezma MD     Physician Orders: PT Eval and Treat    Medical Diagnosis: M54.5,G89.29 (ICD-10-CM) - Chronic midline low back pain, with sciatica presence unspecified      Date of Surgery: NA  Evaluation Date: 2/15/2019  Authorization Period Expiration: 12/31/2019  Plan of Care Certification Period: 5/12/2019  Visit # / Visits authorized:3/ 20   visit date 3/22/2019      Time In: 9: 10 am   Time Out: 10 am   Total Billable Time:50   minutes     Precautions: Standard     Subjective   LB tight. May be related to his bed. More pain at night since moving.  Will consider a new mattress. 6-7 /10  R Lat trunk.  Used ice in the past after weight lifting . Reports decreased pain and tightness after session today   Current 7/10, worst 7/10, Location:  R LB       Pts goals: decrease pain, r/t weight lifting     Objective      Amb I   2/15 MMT:     Hip flex B 5  Hip abd R 4, L 5  Hip ext  B 4        2/15 Back AROM:   arom flexion  75%  Extension 75% with R LBP  sidebending R 75%  sidebending L 75%  R LBP         2/15 Flexibility testing:  HS flex R approx - 40, L - 30  Piriformis flex WFL B            2/15 Palpation/ joint mob:   ttp and guarding R latissimus, decreased pa jm T 12- L 4 with ttp T 12/ L 1        CMS Impairment/Limitation/Restriction for FOTO Lower Back  Survey     Therapist reviewed FOTO scores for Robert Pendleton on 3/22/2019.   FOTO documents entered into [employer] - see Media section.     Limitation Score: 35%  Category: mobility        Current : CJ = at least 20% but < 40% impaired, limited or restricted  Goal: CJ = at least 20% but < 40% impaired, limited or restricted  Discharge:NA            TREATMENT          Robert received  therapeutic exercises to develop strength, endurance, ROM, flexibility, posture and core stabilization for 45 minutes including:      ube 6 min   Transverse abdominal contraction 10 sec x10  Ball squeeze  B shd ext gtb 3 x 10   Lower trunk rotation 20 sec x 3  ltr oscill 3 min red  ltr rot pb 2 min   Bridges pb 10 sec x 10   Quadruped UE/ LE 10 sec x 10  Clams 10x  3 B otb   Sl hip abd 30x  gss slant  90 sec  hss  Stairs  90 sec each        .           Education provided re:posture ed, mechanics supine to sit, selena TA with ex/ adls, avoiding rad sx's with ex.  Ed to cont to use ice 10-20 min daily. Pt verbalized  understanding.   Discussed use of lumbar roll with sitting.     Written Home Exercises Provided:  updated hep ( above) 3/22  Exercises were reviewed and Robert was able to demonstrate them prior to the end of the session.   Pt received a written copy of exercises to perform at home. Robert demonstrated good  understanding of the education provided.      See EMR under pt instructions for exercises given 3/22.   Assessment   No c/o during session, cues needed for posture, core engagement. Needs to be more compliant with hep and visits. Discussed new mattress possibly helping with pain.    Pt prognosis is Excellent.   Pt will benefit from skilled outpatient Physical Therapy to address the deficits stated above and in the chart below, provide pt/family education, and to maximize pt's level of independence.       Goals:  Short Term Goals: 4  weeks          1. Decreased R low back pain to 5/10 Progressing, not met  Long Term Goals: 12 weeks      1. Independent with HEP. Progressing, not met  2. Report decreased   low   Back  pain  <   / =  2  /10 with adls such as  Bed t/f, sit to stand Progressing, not met  3. Increased MMT  for  B Hip ext and hip abd to 5/5  Progressing, not met  4. Increased arom  for  LB  Ext to 100 %  Progressing, not met  5. Improved FOTO score to 22% so he can r/t exercise   Progressing, not met        Pt's spiritual, cultural and educational needs considered and patient is agreeable to the plan of care and goals as stated below:      Anticipated Barriers for therapy:none              Plan   Certification Period/Plan of care expiration:see above.       Outpatient Physical Therapy 1 times weekly for  10   weeks to include the following interventions: Cervical/Lumbar Traction, Electrical Stimulation  , Manual Therapy, Moist Heat/ Ice, Neuromuscular Re-ed, Patient Education and Therapeutic Exercise, dry needling.      Joann Grigsby, PT

## 2019-03-26 ENCOUNTER — DOCUMENTATION ONLY (OUTPATIENT)
Dept: REHABILITATION | Facility: OTHER | Age: 24
End: 2019-03-26

## 2019-04-26 ENCOUNTER — DOCUMENTATION ONLY (OUTPATIENT)
Dept: REHABILITATION | Facility: OTHER | Age: 24
End: 2019-04-26

## 2019-06-14 DIAGNOSIS — A60.01 HERPES SIMPLEX INFECTION OF PENIS: ICD-10-CM

## 2019-06-14 RX ORDER — VALACYCLOVIR HYDROCHLORIDE 1 G/1
1000 TABLET, FILM COATED ORAL DAILY
Qty: 30 TABLET | Refills: 3 | Status: SHIPPED | OUTPATIENT
Start: 2019-06-14 | End: 2019-10-12

## 2019-06-18 ENCOUNTER — OFFICE VISIT (OUTPATIENT)
Dept: INTERNAL MEDICINE | Facility: CLINIC | Age: 24
End: 2019-06-18
Payer: COMMERCIAL

## 2019-06-18 VITALS
HEART RATE: 72 BPM | SYSTOLIC BLOOD PRESSURE: 118 MMHG | TEMPERATURE: 98 F | BODY MASS INDEX: 29.33 KG/M2 | DIASTOLIC BLOOD PRESSURE: 84 MMHG | HEIGHT: 69 IN | RESPIRATION RATE: 16 BRPM | WEIGHT: 198 LBS

## 2019-06-18 DIAGNOSIS — Z71.84 TRAVEL ADVICE ENCOUNTER: ICD-10-CM

## 2019-06-18 DIAGNOSIS — Z00.00 ANNUAL PHYSICAL EXAM: Primary | ICD-10-CM

## 2019-06-18 PROCEDURE — 99395 PREV VISIT EST AGE 18-39: CPT | Mod: S$GLB,,, | Performed by: INTERNAL MEDICINE

## 2019-06-18 PROCEDURE — 99999 PR PBB SHADOW E&M-EST. PATIENT-LVL III: ICD-10-PCS | Mod: PBBFAC,,, | Performed by: INTERNAL MEDICINE

## 2019-06-18 PROCEDURE — 99395 PR PREVENTIVE VISIT,EST,18-39: ICD-10-PCS | Mod: S$GLB,,, | Performed by: INTERNAL MEDICINE

## 2019-06-18 PROCEDURE — 99999 PR PBB SHADOW E&M-EST. PATIENT-LVL III: CPT | Mod: PBBFAC,,, | Performed by: INTERNAL MEDICINE

## 2019-06-18 NOTE — PROGRESS NOTES
Subjective:       Patient ID: Robert Pendleton is a 24 y.o. male.    Chief Complaint: Annual Exam (new patient. arrrived at 12:58 for 12:40 appt.  0 pain today., to show you pdf file of school form for vaccines.) and Ankle Pain (recent saw urgent care   , left ankle.   0 pain now.)    HPI   24 y.o. Male here for annual exam.     Vaccines: Influenza (declined); Tetanus (2013)  Eye exam: declined    Exercise: no  Diet: regular     Past Medical History:  No date: Hair loss  No date: HSV-2 (herpes simplex virus 2) infection  Past Surgical History:  No date: MOUTH SURGERY  Social History    Socioeconomic History      Marital status: Single      Spouse name: Not on file      Number of children: Not on file      Years of education: Not on file      Highest education level: Not on file    Occupational History      Occupation: Investigator     Social Needs      Financial resource strain: Not on file      Food insecurity:        Worry: Not on file        Inability: Not on file      Transportation needs:        Medical: Not on file        Non-medical: Not on file    Tobacco Use      Smoking status: Never Smoker      Smokeless tobacco: Never Used    Substance and Sexual Activity      Alcohol use: Yes        Comment: socially      Drug use: No      Sexual activity: Yes        Partners: Female        Birth control/protection: Condom        Comment: single    Lifestyle      Physical activity:        Days per week: Not on file        Minutes per session: Not on file      Stress: Not on file    Relationships      Social connections:        Talks on phone: Not on file        Gets together: Not on file        Attends Synagogue service: Not on file        Active member of club or organization: Not on file        Attends meetings of clubs or organizations: Not on file        Relationship status: Not on file    Other Topics      Concerns:        Not on file    Social History Narrative      Not on file    Review of patient's  allergies indicates:  No Known Allergies  Robert Pendleton had no medications administered during this visit.    Review of Systems   Constitutional: Negative for activity change, appetite change, chills, diaphoresis, fatigue, fever and unexpected weight change.   HENT: Negative for congestion, mouth sores, postnasal drip, rhinorrhea, sinus pressure, sneezing, sore throat, trouble swallowing and voice change.    Eyes: Negative for discharge, itching and visual disturbance.   Respiratory: Negative for cough, chest tightness, shortness of breath and wheezing.    Cardiovascular: Negative for chest pain, palpitations and leg swelling.   Gastrointestinal: Negative for abdominal pain, blood in stool, constipation, diarrhea, nausea and vomiting.   Endocrine: Negative for cold intolerance and heat intolerance.   Genitourinary: Negative for difficulty urinating, dysuria, flank pain, hematuria and urgency.   Musculoskeletal: Negative for arthralgias, back pain, myalgias and neck pain.   Skin: Negative for rash and wound.   Allergic/Immunologic: Negative for environmental allergies and food allergies.   Neurological: Negative for dizziness, tremors, seizures, syncope, weakness and headaches.   Hematological: Negative for adenopathy. Does not bruise/bleed easily.   Psychiatric/Behavioral: Negative for confusion, sleep disturbance and suicidal ideas. The patient is not nervous/anxious.        Objective:      Physical Exam   Constitutional: He is oriented to person, place, and time. He appears well-developed and well-nourished. No distress.   HENT:   Head: Normocephalic and atraumatic.   Right Ear: External ear normal.   Left Ear: External ear normal.   Nose: Nose normal.   Mouth/Throat: Oropharynx is clear and moist. No oropharyngeal exudate.   Eyes: Pupils are equal, round, and reactive to light. Conjunctivae and EOM are normal. Right eye exhibits no discharge. Left eye exhibits no discharge. No scleral icterus.   Neck: Normal  range of motion. Neck supple. No JVD present. No thyromegaly present.   Cardiovascular: Normal rate, regular rhythm, normal heart sounds and intact distal pulses.   No murmur heard.  Pulmonary/Chest: Effort normal and breath sounds normal. No respiratory distress. He has no wheezes. He has no rales.   Abdominal: Soft. Bowel sounds are normal. He exhibits no distension. There is no tenderness. There is no guarding.   Musculoskeletal: He exhibits no edema.   Lymphadenopathy:     He has no cervical adenopathy.   Neurological: He is alert and oriented to person, place, and time. No cranial nerve deficit. Coordination normal.   Skin: Skin is warm and dry. No rash noted. He is not diaphoretic. No pallor.   Psychiatric: He has a normal mood and affect. Judgment normal.       Assessment:       1. Annual physical exam        Plan:    1. Blood work ordered       Vaccines: Influenza (declined); Tetanus (2013)       Eye exam: declined   2. F/u in 1 yr

## 2019-10-27 ENCOUNTER — DOCUMENTATION ONLY (OUTPATIENT)
Dept: REHABILITATION | Facility: OTHER | Age: 24
End: 2019-10-27

## 2020-03-07 DIAGNOSIS — L64.9 ANDROGENIC ALOPECIA: Primary | ICD-10-CM

## 2020-03-09 ENCOUNTER — TELEPHONE (OUTPATIENT)
Dept: DERMATOLOGY | Facility: CLINIC | Age: 25
End: 2020-03-09

## 2020-03-09 RX ORDER — FINASTERIDE 1 MG/1
TABLET, FILM COATED ORAL
Qty: 90 TABLET | Refills: 0 | Status: SHIPPED | OUTPATIENT
Start: 2020-03-09

## 2020-06-11 RX ORDER — FINASTERIDE 1 MG/1
TABLET, FILM COATED ORAL
Qty: 90 TABLET | Refills: 0 | OUTPATIENT
Start: 2020-06-11

## 2021-12-10 NOTE — PROGRESS NOTES
[facility]  Physical Therapy dc      Name: Robert Pendleton  Clinic Number: [id number]     Therapy Diagnosis:           Encounter Diagnosis   Name Primary?    Chronic right-sided low back pain without sciatica        Physician: Johny Ledezma MD     Physician Orders: PT Eval and Treat    Medical Diagnosis: M54.5,G89.29 (ICD-10-CM) - Chronic midline low back pain, with sciatica presence unspecified      Date of Surgery: NA  Evaluation Date: 2/15/2019  Authorization Period Expiration: 12/31/2019  Plan of Care Certification Period: 5/12/2019  Visits:3/ 20        Precautions: Standard     Subjective   LB tight. May be related to his bed. More pain at night since moving.  Will consider a new mattress. 6-7 /10  R Lat trunk.  Used ice in the past after weight lifting . Reports decreased pain and tightness after session today   Current 7/10, worst 7/10, Location:  R LB       Pts goals: decrease pain, r/t weight lifting     Objective      Amb I   2/15 MMT:     Hip flex B 5  Hip abd R 4, L 5  Hip ext  B 4        2/15 Back AROM:   arom flexion  75%  Extension 75% with R LBP  sidebending R 75%  sidebending L 75%  R LBP           CMS Impairment/Limitation/Restriction for FOTO Lower Back  Survey     Therapist reviewed FOTO scores for Robert Pendleton on 3/22/2019.   FOTO documents entered into [employer] - see Media section.     Limitation Score: 35%  Category: mobility      Goal: CJ = at least 20% but < 40% impaired, limited or restricted  Discharge: 20-40            TREATMENT        pt ed, hep ,ex, nmr.  .        Written Home Exercises Provided:  updated hep ( above) 3/22   rec'd a written copy of exercises to perform at home. Robert demonstrated good  understanding of the education provided.      See EMR under pt instructions for exercises given 3/22.      Unable to assess if goals met due to noncompliance with attendance.  Goals:  Short Term Goals: 4  weeks          1. Decreased R low  back pain to 5/10 Progressing, not met  Long Term Goals: 12 weeks      1. Independent with HEP. Progressing, not met  2. Report decreased   low   Back  pain  <   / =  2  /10 with adls such as  Bed t/f, sit to stand Progressing, not met  3. Increased MMT  for  B Hip ext and hip abd to 5/5  Progressing, not met  4. Increased arom  for  LB  Ext to 100 %  Progressing, not met  5. Improved FOTO score to 22% so he can r/t exercise  Progressing, not met     none